# Patient Record
Sex: FEMALE | Race: WHITE | Employment: PART TIME | URBAN - METROPOLITAN AREA
[De-identification: names, ages, dates, MRNs, and addresses within clinical notes are randomized per-mention and may not be internally consistent; named-entity substitution may affect disease eponyms.]

---

## 2023-11-07 ENCOUNTER — APPOINTMENT (OUTPATIENT)
Dept: GENERAL RADIOLOGY | Age: 18
End: 2023-11-07
Payer: OTHER MISCELLANEOUS

## 2023-11-07 ENCOUNTER — HOSPITAL ENCOUNTER (EMERGENCY)
Age: 18
Discharge: HOME | End: 2023-11-08
Attending: EMERGENCY MEDICINE
Payer: OTHER MISCELLANEOUS

## 2023-11-07 DIAGNOSIS — S69.90XA INJURY OF WRIST, UNSPECIFIED LATERALITY, INITIAL ENCOUNTER: Primary | ICD-10-CM

## 2023-11-07 PROCEDURE — 73110 X-RAY EXAM OF WRIST: CPT

## 2023-11-07 PROCEDURE — 25605 CLTX DST RDL FX/EPHYS SEP W/: CPT

## 2023-11-07 PROCEDURE — 6360000002 HC RX W HCPCS: Performed by: EMERGENCY MEDICINE

## 2023-11-07 PROCEDURE — 73130 X-RAY EXAM OF HAND: CPT

## 2023-11-07 PROCEDURE — 2580000003 HC RX 258: Performed by: EMERGENCY MEDICINE

## 2023-11-07 PROCEDURE — 73100 X-RAY EXAM OF WRIST: CPT

## 2023-11-07 PROCEDURE — 96374 THER/PROPH/DIAG INJ IV PUSH: CPT

## 2023-11-07 PROCEDURE — 2500000003 HC RX 250 WO HCPCS: Performed by: EMERGENCY MEDICINE

## 2023-11-07 PROCEDURE — 99285 EMERGENCY DEPT VISIT HI MDM: CPT

## 2023-11-07 PROCEDURE — 2700000000 HC OXYGEN THERAPY PER DAY

## 2023-11-07 PROCEDURE — 96375 TX/PRO/DX INJ NEW DRUG ADDON: CPT

## 2023-11-07 PROCEDURE — 99152 MOD SED SAME PHYS/QHP 5/>YRS: CPT

## 2023-11-07 RX ORDER — 0.9 % SODIUM CHLORIDE 0.9 %
1000 INTRAVENOUS SOLUTION INTRAVENOUS ONCE
Status: COMPLETED | OUTPATIENT
Start: 2023-11-07 | End: 2023-11-07

## 2023-11-07 RX ORDER — ETOMIDATE 2 MG/ML
10 INJECTION INTRAVENOUS ONCE
Status: COMPLETED | OUTPATIENT
Start: 2023-11-07 | End: 2023-11-07

## 2023-11-07 RX ORDER — MORPHINE SULFATE 4 MG/ML
4 INJECTION, SOLUTION INTRAMUSCULAR; INTRAVENOUS ONCE
Status: COMPLETED | OUTPATIENT
Start: 2023-11-07 | End: 2023-11-07

## 2023-11-07 RX ADMIN — SODIUM CHLORIDE 1000 ML: 9 INJECTION, SOLUTION INTRAVENOUS at 23:05

## 2023-11-07 RX ADMIN — MORPHINE SULFATE 4 MG: 4 INJECTION, SOLUTION INTRAMUSCULAR; INTRAVENOUS at 23:03

## 2023-11-07 RX ADMIN — ETOMIDATE 10 MG: 2 INJECTION, SOLUTION INTRAVENOUS at 23:44

## 2023-11-07 ASSESSMENT — LIFESTYLE VARIABLES
HOW MANY STANDARD DRINKS CONTAINING ALCOHOL DO YOU HAVE ON A TYPICAL DAY: 1 OR 2
HOW OFTEN DO YOU HAVE A DRINK CONTAINING ALCOHOL: MONTHLY OR LESS

## 2023-11-07 ASSESSMENT — PAIN - FUNCTIONAL ASSESSMENT
PAIN_FUNCTIONAL_ASSESSMENT: INTOLERABLE, UNABLE TO DO ANY ACTIVE OR PASSIVE ACTIVITIES
PAIN_FUNCTIONAL_ASSESSMENT: 0-10

## 2023-11-07 ASSESSMENT — PAIN DESCRIPTION - ORIENTATION: ORIENTATION: LEFT

## 2023-11-07 ASSESSMENT — PAIN SCALES - GENERAL: PAINLEVEL_OUTOF10: 10

## 2023-11-07 ASSESSMENT — PAIN DESCRIPTION - LOCATION: LOCATION: WRIST

## 2023-11-07 ASSESSMENT — PAIN DESCRIPTION - DESCRIPTORS: DESCRIPTORS: SHARP

## 2023-11-08 ENCOUNTER — TELEPHONE (OUTPATIENT)
Dept: ORTHOPEDIC SURGERY | Age: 18
End: 2023-11-08

## 2023-11-08 ENCOUNTER — OFFICE VISIT (OUTPATIENT)
Dept: ORTHOPEDIC SURGERY | Age: 18
End: 2023-11-08

## 2023-11-08 VITALS
DIASTOLIC BLOOD PRESSURE: 85 MMHG | BODY MASS INDEX: 16.48 KG/M2 | TEMPERATURE: 98.3 F | OXYGEN SATURATION: 100 % | HEART RATE: 90 BPM | RESPIRATION RATE: 14 BRPM | WEIGHT: 105 LBS | HEIGHT: 67 IN | SYSTOLIC BLOOD PRESSURE: 119 MMHG

## 2023-11-08 VITALS — BODY MASS INDEX: 16.48 KG/M2 | HEIGHT: 67 IN | WEIGHT: 105 LBS

## 2023-11-08 DIAGNOSIS — S52.572A OTHER CLOSED INTRA-ARTICULAR FRACTURE OF DISTAL END OF LEFT RADIUS, INITIAL ENCOUNTER: Primary | ICD-10-CM

## 2023-11-08 DIAGNOSIS — F17.200 CURRENT SMOKER: ICD-10-CM

## 2023-11-08 PROBLEM — S52.502A CLOSED FRACTURE OF LEFT DISTAL RADIUS: Status: ACTIVE | Noted: 2023-11-08

## 2023-11-08 PROCEDURE — 6370000000 HC RX 637 (ALT 250 FOR IP): Performed by: EMERGENCY MEDICINE

## 2023-11-08 RX ORDER — OXYCODONE HYDROCHLORIDE AND ACETAMINOPHEN 5; 325 MG/1; MG/1
2 TABLET ORAL ONCE
Status: COMPLETED | OUTPATIENT
Start: 2023-11-08 | End: 2023-11-08

## 2023-11-08 RX ORDER — OXYCODONE HYDROCHLORIDE AND ACETAMINOPHEN 5; 325 MG/1; MG/1
1 TABLET ORAL EVERY 6 HOURS PRN
Qty: 12 TABLET | Refills: 0 | Status: SHIPPED | OUTPATIENT
Start: 2023-11-08 | End: 2023-11-11

## 2023-11-08 RX ADMIN — OXYCODONE AND ACETAMINOPHEN 2 TABLET: 5; 325 TABLET ORAL at 00:29

## 2023-11-08 ASSESSMENT — PAIN SCALES - GENERAL: PAINLEVEL_OUTOF10: 10

## 2023-11-08 ASSESSMENT — PAIN DESCRIPTION - LOCATION: LOCATION: ARM
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
## 2023-11-08 NOTE — PROGRESS NOTES
Patient not reached. Preop instructions left on voice mail. Number_______________    -Date__11/9/23_____time__1015_____arrival____0845 masc________  -Nothing to eat or drink after midnight  -Responsible adult 25 or older to stay on site while you are here and drive you home and stay with you after  -Follow any instructions your doctors office has given you  -Bring a complete list of all your medications and supplements  -If you normally take the following medications in the morning please do so with a small    sip of water-heart,blood pressure,seizure,breathing or thyroid-avoid water pilll Do not take blood pressure medications ending in \"ivan\" or \"pril\" the AM of surgery or the nikki prior  -You may use your inhalers  -Take half of your normal dose of any long acting insulins the night before-do not take    any diabetic medications in the morning  -Follow your doctors instructions regarding blood thinners  -Any questions call your surgeons office            VISITOR POLICY(subject to change)    Current policy is 2 visitors per patient. No children. Masks at discretion of facility. Visiting hours are 8a-8p. Overnight visitors will be at the discretion of the nurse. All policies are subject to change.
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
## 2023-11-08 NOTE — PROGRESS NOTES
In room for conscious sedation. Pt was placed on 2l/m nasal cannula, ETC02 , ambu bag and suction equipment.  Pt did not have any respiratory issues during procedure

## 2023-11-08 NOTE — TELEPHONE ENCOUNTER
Auth: NPR  Date: 11/09/2023  Reference # [id number]  Spoke with: ONLINE  Type of SX: OUTPATIENT  Location: [facility]  CPT: [id number]   DX: S52.502A  SX area: LEFT WRIST  Insurance: Mi Dennis

## 2023-11-08 NOTE — ED NOTES
D/C: Order noted for d/c. Pt confirmed d/c paperwork have correct name. Discharge and education instructions reviewed with patient. Teach-back successful. Pt verbalized understanding and signed d/c papers. Pt denied questions at this time. No acute distress noted. Patient instructed to follow-up as noted - return to emergency department if symptoms worsen. Patient verbalized understanding. Discharged per EDMD with discharge instructions. Pt discharged  to private vehicle. Patient stable upon departure. Pt alert and oriented upon time of discharge, pt wheeled to car with mom and boyfriend.      Litzy Moss RN  11/08/23 0127

## 2023-11-09 ENCOUNTER — APPOINTMENT (OUTPATIENT)
Dept: GENERAL RADIOLOGY | Age: 18
End: 2023-11-09
Attending: ORTHOPAEDIC SURGERY
Payer: COMMERCIAL

## 2023-11-09 ENCOUNTER — ANESTHESIA (OUTPATIENT)
Dept: OPERATING ROOM | Age: 18
End: 2023-11-09
Payer: COMMERCIAL

## 2023-11-09 ENCOUNTER — HOSPITAL ENCOUNTER (OUTPATIENT)
Age: 18
Setting detail: OUTPATIENT SURGERY
Discharge: HOME | End: 2023-11-09
Attending: ORTHOPAEDIC SURGERY | Admitting: ORTHOPAEDIC SURGERY
Payer: COMMERCIAL

## 2023-11-09 ENCOUNTER — ANESTHESIA EVENT (OUTPATIENT)
Dept: OPERATING ROOM | Age: 18
End: 2023-11-09
Payer: COMMERCIAL

## 2023-11-09 VITALS
DIASTOLIC BLOOD PRESSURE: 83 MMHG | HEIGHT: 66 IN | WEIGHT: 101 LBS | TEMPERATURE: 97.5 F | BODY MASS INDEX: 16.23 KG/M2 | OXYGEN SATURATION: 100 % | SYSTOLIC BLOOD PRESSURE: 120 MMHG | HEART RATE: 77 BPM | RESPIRATION RATE: 13 BRPM

## 2023-11-09 LAB — HCG UR QL: NEGATIVE

## 2023-11-09 PROCEDURE — 3700000000 HC ANESTHESIA ATTENDED CARE: Performed by: ORTHOPAEDIC SURGERY

## 2023-11-09 PROCEDURE — 2720000010 HC SURG SUPPLY STERILE: Performed by: ORTHOPAEDIC SURGERY

## 2023-11-09 PROCEDURE — C1713 ANCHOR/SCREW BN/BN,TIS/BN: HCPCS | Performed by: ORTHOPAEDIC SURGERY

## 2023-11-09 PROCEDURE — 2580000003 HC RX 258: Performed by: ORTHOPAEDIC SURGERY

## 2023-11-09 PROCEDURE — 7100000011 HC PHASE II RECOVERY - ADDTL 15 MIN: Performed by: ORTHOPAEDIC SURGERY

## 2023-11-09 PROCEDURE — 6360000002 HC RX W HCPCS: Performed by: NURSE ANESTHETIST, CERTIFIED REGISTERED

## 2023-11-09 PROCEDURE — 84703 CHORIONIC GONADOTROPIN ASSAY: CPT

## 2023-11-09 PROCEDURE — A4217 STERILE WATER/SALINE, 500 ML: HCPCS | Performed by: ORTHOPAEDIC SURGERY

## 2023-11-09 PROCEDURE — 2709999900 HC NON-CHARGEABLE SUPPLY: Performed by: ORTHOPAEDIC SURGERY

## 2023-11-09 PROCEDURE — 6360000002 HC RX W HCPCS: Performed by: ORTHOPAEDIC SURGERY

## 2023-11-09 PROCEDURE — 7100000001 HC PACU RECOVERY - ADDTL 15 MIN: Performed by: ORTHOPAEDIC SURGERY

## 2023-11-09 PROCEDURE — 7100000000 HC PACU RECOVERY - FIRST 15 MIN: Performed by: ORTHOPAEDIC SURGERY

## 2023-11-09 PROCEDURE — 6370000000 HC RX 637 (ALT 250 FOR IP): Performed by: ANESTHESIOLOGY

## 2023-11-09 PROCEDURE — 3700000001 HC ADD 15 MINUTES (ANESTHESIA): Performed by: ORTHOPAEDIC SURGERY

## 2023-11-09 PROCEDURE — 3600000014 HC SURGERY LEVEL 4 ADDTL 15MIN: Performed by: ORTHOPAEDIC SURGERY

## 2023-11-09 PROCEDURE — 3600000004 HC SURGERY LEVEL 4 BASE: Performed by: ORTHOPAEDIC SURGERY

## 2023-11-09 PROCEDURE — 7100000010 HC PHASE II RECOVERY - FIRST 15 MIN: Performed by: ORTHOPAEDIC SURGERY

## 2023-11-09 PROCEDURE — 6360000002 HC RX W HCPCS

## 2023-11-09 PROCEDURE — 73100 X-RAY EXAM OF WRIST: CPT

## 2023-11-09 PROCEDURE — 6360000002 HC RX W HCPCS: Performed by: ANESTHESIOLOGY

## 2023-11-09 PROCEDURE — 2500000003 HC RX 250 WO HCPCS: Performed by: NURSE ANESTHETIST, CERTIFIED REGISTERED

## 2023-11-09 DEVICE — VOLAR PLATE NARROW LEFT, X-SHORT
Type: IMPLANTABLE DEVICE | Site: WRIST | Status: FUNCTIONAL
Brand: VARIAX

## 2023-11-09 DEVICE — LOCKING SCREW, FULLY THREADED,T8
Type: IMPLANTABLE DEVICE | Site: WRIST | Status: FUNCTIONAL
Brand: VARIAX

## 2023-11-09 DEVICE — BONE SCREW, FULLY THREADED, T8
Type: IMPLANTABLE DEVICE | Site: WRIST | Status: FUNCTIONAL
Brand: VARIAX

## 2023-11-09 RX ORDER — HYDRALAZINE HYDROCHLORIDE 20 MG/ML
10 INJECTION INTRAMUSCULAR; INTRAVENOUS
Status: DISCONTINUED | OUTPATIENT
Start: 2023-11-09 | End: 2023-11-09 | Stop reason: HOSPADM

## 2023-11-09 RX ORDER — DEXAMETHASONE SODIUM PHOSPHATE 4 MG/ML
INJECTION, SOLUTION INTRA-ARTICULAR; INTRALESIONAL; INTRAMUSCULAR; INTRAVENOUS; SOFT TISSUE PRN
Status: DISCONTINUED | OUTPATIENT
Start: 2023-11-09 | End: 2023-11-09 | Stop reason: SDUPTHER

## 2023-11-09 RX ORDER — HYDROMORPHONE HYDROCHLORIDE 2 MG/ML
0.5 INJECTION, SOLUTION INTRAMUSCULAR; INTRAVENOUS; SUBCUTANEOUS EVERY 5 MIN PRN
Status: DISCONTINUED | OUTPATIENT
Start: 2023-11-09 | End: 2023-11-09 | Stop reason: HOSPADM

## 2023-11-09 RX ORDER — SODIUM CHLORIDE 0.9 % (FLUSH) 0.9 %
5-40 SYRINGE (ML) INJECTION EVERY 12 HOURS SCHEDULED
Status: DISCONTINUED | OUTPATIENT
Start: 2023-11-09 | End: 2023-11-09 | Stop reason: HOSPADM

## 2023-11-09 RX ORDER — ONDANSETRON 2 MG/ML
INJECTION INTRAMUSCULAR; INTRAVENOUS PRN
Status: DISCONTINUED | OUTPATIENT
Start: 2023-11-09 | End: 2023-11-09 | Stop reason: SDUPTHER

## 2023-11-09 RX ORDER — PROPOFOL 10 MG/ML
INJECTION, EMULSION INTRAVENOUS PRN
Status: DISCONTINUED | OUTPATIENT
Start: 2023-11-09 | End: 2023-11-09 | Stop reason: SDUPTHER

## 2023-11-09 RX ORDER — MIDAZOLAM HYDROCHLORIDE 1 MG/ML
INJECTION INTRAMUSCULAR; INTRAVENOUS PRN
Status: DISCONTINUED | OUTPATIENT
Start: 2023-11-09 | End: 2023-11-09 | Stop reason: SDUPTHER

## 2023-11-09 RX ORDER — PROCHLORPERAZINE EDISYLATE 5 MG/ML
5 INJECTION INTRAMUSCULAR; INTRAVENOUS
Status: DISCONTINUED | OUTPATIENT
Start: 2023-11-09 | End: 2023-11-09 | Stop reason: HOSPADM

## 2023-11-09 RX ORDER — FENTANYL CITRATE 50 UG/ML
INJECTION, SOLUTION INTRAMUSCULAR; INTRAVENOUS
Status: COMPLETED
Start: 2023-11-09 | End: 2023-11-09

## 2023-11-09 RX ORDER — ONDANSETRON 2 MG/ML
4 INJECTION INTRAMUSCULAR; INTRAVENOUS
Status: DISCONTINUED | OUTPATIENT
Start: 2023-11-09 | End: 2023-11-09 | Stop reason: HOSPADM

## 2023-11-09 RX ORDER — FENTANYL CITRATE 50 UG/ML
25 INJECTION, SOLUTION INTRAMUSCULAR; INTRAVENOUS EVERY 5 MIN PRN
Status: DISCONTINUED | OUTPATIENT
Start: 2023-11-09 | End: 2023-11-09 | Stop reason: HOSPADM

## 2023-11-09 RX ORDER — SODIUM CHLORIDE 0.9 % (FLUSH) 0.9 %
5-40 SYRINGE (ML) INJECTION PRN
Status: DISCONTINUED | OUTPATIENT
Start: 2023-11-09 | End: 2023-11-09 | Stop reason: HOSPADM

## 2023-11-09 RX ORDER — OXYCODONE HYDROCHLORIDE 5 MG/1
5 TABLET ORAL
Status: COMPLETED | OUTPATIENT
Start: 2023-11-09 | End: 2023-11-09

## 2023-11-09 RX ORDER — CEPHALEXIN 500 MG/1
500 CAPSULE ORAL 4 TIMES DAILY
Qty: 20 CAPSULE | Refills: 0 | Status: SHIPPED | OUTPATIENT
Start: 2023-11-09 | End: 2023-11-14

## 2023-11-09 RX ORDER — BUPIVACAINE HYDROCHLORIDE 5 MG/ML
INJECTION, SOLUTION EPIDURAL; INTRACAUDAL
Status: COMPLETED | OUTPATIENT
Start: 2023-11-09 | End: 2023-11-09

## 2023-11-09 RX ORDER — LIDOCAINE HYDROCHLORIDE 10 MG/ML
1 INJECTION, SOLUTION EPIDURAL; INFILTRATION; INTRACAUDAL; PERINEURAL
Status: DISCONTINUED | OUTPATIENT
Start: 2023-11-09 | End: 2023-11-09 | Stop reason: HOSPADM

## 2023-11-09 RX ORDER — SODIUM CHLORIDE 9 MG/ML
INJECTION, SOLUTION INTRAVENOUS PRN
Status: DISCONTINUED | OUTPATIENT
Start: 2023-11-09 | End: 2023-11-09 | Stop reason: HOSPADM

## 2023-11-09 RX ORDER — LABETALOL HYDROCHLORIDE 5 MG/ML
10 INJECTION, SOLUTION INTRAVENOUS
Status: DISCONTINUED | OUTPATIENT
Start: 2023-11-09 | End: 2023-11-09 | Stop reason: HOSPADM

## 2023-11-09 RX ORDER — SODIUM CHLORIDE 9 MG/ML
INJECTION, SOLUTION INTRAVENOUS CONTINUOUS
Status: DISCONTINUED | OUTPATIENT
Start: 2023-11-09 | End: 2023-11-09 | Stop reason: HOSPADM

## 2023-11-09 RX ORDER — LIDOCAINE HYDROCHLORIDE 20 MG/ML
INJECTION, SOLUTION EPIDURAL; INFILTRATION; INTRACAUDAL; PERINEURAL PRN
Status: DISCONTINUED | OUTPATIENT
Start: 2023-11-09 | End: 2023-11-09 | Stop reason: SDUPTHER

## 2023-11-09 RX ORDER — FENTANYL CITRATE 50 UG/ML
INJECTION, SOLUTION INTRAMUSCULAR; INTRAVENOUS PRN
Status: DISCONTINUED | OUTPATIENT
Start: 2023-11-09 | End: 2023-11-09 | Stop reason: SDUPTHER

## 2023-11-09 RX ADMIN — FENTANYL CITRATE 50 MCG: 50 INJECTION, SOLUTION INTRAMUSCULAR; INTRAVENOUS at 10:04

## 2023-11-09 RX ADMIN — PROPOFOL 50 MG: 10 INJECTION, EMULSION INTRAVENOUS at 09:47

## 2023-11-09 RX ADMIN — FENTANYL CITRATE 25 MCG: 50 INJECTION INTRAMUSCULAR; INTRAVENOUS at 11:11

## 2023-11-09 RX ADMIN — FENTANYL CITRATE 25 MCG: 50 INJECTION, SOLUTION INTRAMUSCULAR; INTRAVENOUS at 09:44

## 2023-11-09 RX ADMIN — FENTANYL CITRATE 25 MCG: 50 INJECTION, SOLUTION INTRAMUSCULAR; INTRAVENOUS at 11:11

## 2023-11-09 RX ADMIN — FENTANYL CITRATE 50 MCG: 50 INJECTION, SOLUTION INTRAMUSCULAR; INTRAVENOUS at 09:50

## 2023-11-09 RX ADMIN — OXYCODONE HYDROCHLORIDE 5 MG: 5 TABLET ORAL at 12:06

## 2023-11-09 RX ADMIN — CEFAZOLIN 2000 MG: 2 INJECTION, POWDER, FOR SOLUTION INTRAMUSCULAR; INTRAVENOUS at 09:40

## 2023-11-09 RX ADMIN — HYDROMORPHONE HYDROCHLORIDE 0.5 MG: 2 INJECTION, SOLUTION INTRAMUSCULAR; INTRAVENOUS; SUBCUTANEOUS at 10:57

## 2023-11-09 RX ADMIN — HYDROMORPHONE HYDROCHLORIDE 0.5 MG: 2 INJECTION, SOLUTION INTRAMUSCULAR; INTRAVENOUS; SUBCUTANEOUS at 11:06

## 2023-11-09 RX ADMIN — ONDANSETRON 4 MG: 2 INJECTION INTRAMUSCULAR; INTRAVENOUS at 09:54

## 2023-11-09 RX ADMIN — DEXAMETHASONE SODIUM PHOSPHATE 4 MG: 4 INJECTION, SOLUTION INTRAMUSCULAR; INTRAVENOUS at 09:54

## 2023-11-09 RX ADMIN — FENTANYL CITRATE 25 MCG: 50 INJECTION, SOLUTION INTRAMUSCULAR; INTRAVENOUS at 09:42

## 2023-11-09 RX ADMIN — HYDROMORPHONE HYDROCHLORIDE 0.5 MG: 2 INJECTION, SOLUTION INTRAMUSCULAR; INTRAVENOUS; SUBCUTANEOUS at 11:02

## 2023-11-09 RX ADMIN — SODIUM CHLORIDE: 9 INJECTION, SOLUTION INTRAVENOUS at 08:19

## 2023-11-09 RX ADMIN — MIDAZOLAM 1 MG: 1 INJECTION INTRAMUSCULAR; INTRAVENOUS at 09:44

## 2023-11-09 RX ADMIN — MIDAZOLAM 1 MG: 1 INJECTION INTRAMUSCULAR; INTRAVENOUS at 09:42

## 2023-11-09 RX ADMIN — LIDOCAINE HYDROCHLORIDE 40 MG: 20 INJECTION, SOLUTION EPIDURAL; INFILTRATION; INTRACAUDAL; PERINEURAL at 09:45

## 2023-11-09 RX ADMIN — PROPOFOL 200 MG: 10 INJECTION, EMULSION INTRAVENOUS at 09:46

## 2023-11-09 ASSESSMENT — PAIN DESCRIPTION - DESCRIPTORS
DESCRIPTORS: ACHING
DESCRIPTORS: ACHING;SHARP

## 2023-11-09 ASSESSMENT — PAIN DESCRIPTION - PAIN TYPE
TYPE: SURGICAL PAIN

## 2023-11-09 ASSESSMENT — PAIN DESCRIPTION - LOCATION
LOCATION: ARM

## 2023-11-09 ASSESSMENT — PAIN - FUNCTIONAL ASSESSMENT: PAIN_FUNCTIONAL_ASSESSMENT: 0-10

## 2023-11-09 ASSESSMENT — PAIN SCALES - GENERAL
PAINLEVEL_OUTOF10: 5
PAINLEVEL_OUTOF10: 9
PAINLEVEL_OUTOF10: 3
PAINLEVEL_OUTOF10: 2

## 2023-11-09 ASSESSMENT — PAIN DESCRIPTION - ORIENTATION
ORIENTATION: LEFT

## 2023-11-09 ASSESSMENT — ENCOUNTER SYMPTOMS: SHORTNESS OF BREATH: 0

## 2023-11-09 NOTE — PROGRESS NOTES
Pt awake and alert at this time. Pt on RA, and VSS. Pt reports improved pain and denies nausea, tolerating PO. Skin warm, splint and ace in place, able to wiggle fingers. Pt meets criteria to be discharged from Phase 1.

## 2023-11-09 NOTE — BRIEF OP NOTE
Brief Postoperative Note      Patient: Mone Moss  YOB: 2005  MRN: [id number]    Date of Procedure: 11/9/2023    Pre-Op Diagnosis Codes:     * Closed fracture of distal end of left radius, unspecified fracture morphology, initial encounter [S52.502A]    Post-Op Diagnosis: Same       Procedure(s):  LEFT OPEN REDUCTION AND INTERNAL FIXATION WRIST-GALI    Surgeon(s):  Yoel Ochoa MD    Assistant:  First Assistant: Denver Seller, RN    Anesthesia: General    Estimated Blood Loss (mL): Minimal    Complications: None    Specimens:   * No specimens in log *    Implants:  Implant Name Type Inv. Item Serial No.  Lot No. LRB No. Used Action   PLATE BNE JOYCE XSH HARI 49X2 MM LT VOLAR DSTL 8 HOLE TI STRL - [id number]  PLATE BNE JOYCE XSH HARI 49X2 MM LT VOLAR DSTL 8 HOLE TI STRL  [employer] [facility]  Left 1 Implanted   SCREW BONE L14MM DIA2.7MM WR TI ALLOY NONLOCKING FULL THRD - [id number]  SCREW BONE L14MM DIA2.7MM WR TI ALLOY NONLOCKING FULL THRD  [employer] [facility]  Left 1 Implanted   SCREW BNE L20MM DIA2. 7MM LES FULL THRD T8 DRV VARIAX - [id number]  SCREW BNE L20MM DIA2. 7MM LES FULL THRD T8 DRV VARIAX  [employer] [facility]  Left 1 Implanted   SCREW BONE L18MM DIA2.7MM WR TI ALLOY LCK FULL THRD T8 DRV - [id number]  SCREW BONE L18MM DIA2.7MM WR TI ALLOY LCK FULL THRD T8 DRV  [employer] [facility]  Left 1 Implanted   SCREW BNE L16MM DIA2.7MM WRST TI ALLY LES FULL THRD T8 DRV - [id number]  SCREW BNE L16MM DIA2.7MM WRST TI ALLY LES FULL THRD T8 DRV  [employer] [facility]  Left 1 Implanted   SCREW BONE L14MM DIA2.7MM WR TI ALLOY LCK FULL THRD T8 DRV - [id number]  SCREW BONE L14MM DIA2.7MM WR TI ALLOY LCK FULL THRD T8 DRV  [employer]  Left 1 Implanted         Drains: * No LDAs found *    Findings: Same.       Electronically signed by Yoel Ochoa MD on 11/9/2023 at 1:19 PM

## 2023-11-09 NOTE — PROGRESS NOTES
Discharge instructions review with patient and significant other, Rommel. All home medications have been reviewed, pt v/u. Pt discharged via wheelchair. Pt discharged with filled prescription and all belongings. Boyfriend taking stable pt home.
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
## 2023-11-09 NOTE — ANESTHESIA POSTPROCEDURE EVALUATION
Department of Anesthesiology  Postprocedure Note    Patient: Brianne Mann  MRN: [id number]  YOB: 2005  Date of evaluation: 11/9/2023      Procedure Summary     Date: 11/09/23 Room / Location: [address]    Anesthesia Start: 9368 Anesthesia Stop: 1038    Procedure: LEFT OPEN REDUCTION AND INTERNAL FIXATION WRIST-GALI (Left: Wrist) Diagnosis:       Closed fracture of distal end of left radius, unspecified fracture morphology, initial encounter      (Closed fracture of distal end of left radius, unspecified fracture morphology, initial encounter [S52.502A])    Surgeons: Denise Karimi MD Responsible Provider: Dakota Caballero MD    Anesthesia Type: general ASA Status: 1          Anesthesia Type: No value filed.     Catracho Phase I: Catracho Score: 10    Catracho Phase II:        Anesthesia Post Evaluation    Patient location during evaluation: PACU  Patient participation: complete - patient participated  Level of consciousness: awake and alert  Airway patency: patent  Nausea & Vomiting: no nausea and no vomiting  Complications: no  Cardiovascular status: hemodynamically stable  Respiratory status: acceptable  Hydration status: stable  Multimodal analgesia pain management approach  Pain management: adequate

## 2023-11-09 NOTE — PROGRESS NOTES
Pt arrived from OR to PACU bay 9. Reported received from [facility] [employer] staff. Pt not arousable to voice. Surgical incisions dressings in place to L wrist. Pt on RA with OPA, NSR, and VSS. Will continue to monitor.

## 2023-11-09 NOTE — DISCHARGE INSTRUCTIONS
lightheadedness, dizziness, or sleepiness following surgery. Rest at home today- increase activity as tolerated. Progress slowly to a regular diet unless your physician has instructed you otherwise. Drink plenty of water. If persistent nausea and vomiting becomes a problem, call your physician. Coughing, sore throat and muscle aches are other side effects of anesthesia, and should improve with time. Do not drive or operate machinery while taking narcotics.

## 2023-11-10 DIAGNOSIS — S52.572A OTHER CLOSED INTRA-ARTICULAR FRACTURE OF DISTAL END OF LEFT RADIUS, INITIAL ENCOUNTER: Primary | ICD-10-CM

## 2023-11-10 RX ORDER — HYDROCODONE BITARTRATE AND ACETAMINOPHEN 5; 325 MG/1; MG/1
1 TABLET ORAL EVERY 6 HOURS PRN
Qty: 20 TABLET | Refills: 0 | Status: SHIPPED | OUTPATIENT
Start: 2023-11-10 | End: 2023-11-15

## 2023-11-10 RX ORDER — CEPHALEXIN 500 MG/1
500 CAPSULE ORAL 4 TIMES DAILY
Qty: 20 CAPSULE | Refills: 0 | Status: SHIPPED | OUTPATIENT
Start: 2023-11-10 | End: 2023-11-15

## 2023-11-10 NOTE — TELEPHONE ENCOUNTER
Oxycodone 5-325 on 11/8/23 QT: 12 tablets    Will route [place] to [facility] per surgical protocol. [facility] [address] selected.

## 2023-11-10 NOTE — TELEPHONE ENCOUNTER
Called and spoke with patient to notify that a prescription called Lin Muñoz will be sent to her pharmacy today. Educated on supplementing with Tylenol as patient was distraught about pain. Confirmed that patient is elevating her hand above heart level to assist with swelling and pain control. I advised the patient to call our office today if she has further concerns.

## 2023-11-10 NOTE — TELEPHONE ENCOUNTER
General Question     Subject: PRESCRIPTION REFILL  Patient and /or Facility Request: Laura Cerda  Contact Number: [phone]    PATIENTS MOTHER CALLED IN REQUESTING A REFILL ON PAIN MEDICATION. PATIENTS MOTHER STATES THE PATIENT HAS 3 PILLS LEFT. PATIENT IS EXTREME PAIN AND HAS ENOUGH DOSES FOR TODAY DAY    PATIENT HAD SURGERY YESTERDAY MORNING. PATIENT CAN BE REACHED AT THE TELEPHONE NUMBER LISTED ABOVE.     PATIENT REQUEST THE PRESCRIPTION TO BE SENT TO [address] [place] [address] Se

## 2023-11-22 ENCOUNTER — OFFICE VISIT (OUTPATIENT)
Dept: ORTHOPEDIC SURGERY | Age: 18
End: 2023-11-22

## 2023-11-22 VITALS — HEIGHT: 66 IN | WEIGHT: 100.97 LBS | BODY MASS INDEX: 16.23 KG/M2

## 2023-11-22 DIAGNOSIS — S52.572A OTHER CLOSED INTRA-ARTICULAR FRACTURE OF DISTAL END OF LEFT RADIUS, INITIAL ENCOUNTER: Primary | ICD-10-CM

## 2024-01-03 ENCOUNTER — OFFICE VISIT (OUTPATIENT)
Dept: ORTHOPEDIC SURGERY | Age: 19
End: 2024-01-03

## 2024-01-03 DIAGNOSIS — S52.572A OTHER CLOSED INTRA-ARTICULAR FRACTURE OF DISTAL END OF LEFT RADIUS, INITIAL ENCOUNTER: Primary | ICD-10-CM

## 2024-01-03 PROCEDURE — 99024 POSTOP FOLLOW-UP VISIT: CPT | Performed by: NURSE PRACTITIONER

## 2024-01-03 PROCEDURE — APPNB15 APP NON BILLABLE TIME 0-15 MINS: Performed by: NURSE PRACTITIONER

## 2024-01-04 NOTE — PROGRESS NOTES
DIAGNOSIS:  Left distal radius 2 parts intra articular displaced fracture, status post ORIF.    DATE OF SURGERY:  11/9/2023.    HISTORY OF PRESENT ILLNESS:  Ms. Servin 18 y.o.  female right handed returns today  for 8 weeks postoperative visit.  The patient denies any significant pain in the left wrist.  Rates pain a 0/10 VAS and doing very well.  Sometimes her index finger is painful when bending it and her wrist only becomes sore with increase use.  No numbness or tingling sensation. No fever or Chills. She  is in a brace. Works as a [employer].     PHYSICAL EXAMINATION:  The incision is completely healed .  No signs of any erythema or drainage. She  has no pain with the active or passive range of motion of the left wrist, but decrease ROM.  She  has intact sensation, distally, and she  is neurovascularly intact.    IMAGING:  Three views left wrist taken today in the office showed anatomic alignment of left distal radius, plate and screws in good position, no loosening.      IMPRESSION:  8 weeks out from left distal radius ORIF and doing very well.    PLAN:  I have told the patient to work on ROM, as well as strengthening exercises.  She would like to work on this on her own.  She can discontinue the forearm brace. No heavy impact activities. The patient will come back for a follow up in 6 weeks.  At that time, we will take 3 views of the left wrist. PT if needed then.    As this patient has demonstrated risk factors for osteoporosis, such as age greater than fifty years and evidence of a fracture, I have referred the patient back to the primary care physician for evaluation for osteoporosis, including consideration for DEXA scanning, if this is felt to be clinically indicated.  The patient is advised to contact the primary care physician to follow-up for further evaluation.             Faina Malagon, ANKUR - CNP

## 2024-02-14 ENCOUNTER — OFFICE VISIT (OUTPATIENT)
Dept: ORTHOPEDIC SURGERY | Age: 19
End: 2024-02-14
Payer: COMMERCIAL

## 2024-02-14 VITALS — BODY MASS INDEX: 16.07 KG/M2 | HEIGHT: 66 IN | WEIGHT: 100 LBS

## 2024-02-14 DIAGNOSIS — S52.572A OTHER CLOSED INTRA-ARTICULAR FRACTURE OF DISTAL END OF LEFT RADIUS, INITIAL ENCOUNTER: Primary | ICD-10-CM

## 2024-02-14 PROCEDURE — 99213 OFFICE O/P EST LOW 20 MIN: CPT | Performed by: ORTHOPAEDIC SURGERY

## 2024-02-14 PROCEDURE — G8484 FLU IMMUNIZE NO ADMIN: HCPCS | Performed by: ORTHOPAEDIC SURGERY

## 2024-02-14 PROCEDURE — 1036F TOBACCO NON-USER: CPT | Performed by: ORTHOPAEDIC SURGERY

## 2024-02-14 PROCEDURE — G8427 DOCREV CUR MEDS BY ELIG CLIN: HCPCS | Performed by: ORTHOPAEDIC SURGERY

## 2024-02-14 PROCEDURE — G8419 CALC BMI OUT NRM PARAM NOF/U: HCPCS | Performed by: ORTHOPAEDIC SURGERY

## 2024-02-14 NOTE — PROGRESS NOTES
DIAGNOSIS:  Left distal radius 2 parts intra articular displaced fracture, status post ORIF.    DATE OF SURGERY:  11/9/2023.    HISTORY OF PRESENT ILLNESS:  Jenni Servin 18 y.o.  female right handed returns today for 3 months postoperative visit.  The patient denies any significant pain in the left wrist.  Rates pain a 0/10 VAS and doing very well.  No numbness or tingling sensation. No fever or Chills.      No past medical history on file.    Past Surgical History:   Procedure Laterality Date    TONSILLECTOMY AND ADENOIDECTOMY      WRIST FRACTURE SURGERY Left 11/9/2023    LEFT OPEN REDUCTION AND INTERNAL FIXATION WRIST-GALI performed by Gabe Landers MD at [facility] ASC OR       Social History     Socioeconomic History    Marital status: Single     Spouse name: Not on file    Number of children: Not on file    Years of education: Not on file    Highest education level: Not on file   Occupational History    Not on file   Tobacco Use    Smoking status: Never    Smokeless tobacco: Never   Vaping Use    Vaping Use: Never used   Substance and Sexual Activity    Alcohol use: Yes     Comment: Occasionally    Drug use: Yes     Frequency: 7.0 times per week     Types: Marijuana (Weed)    Sexual activity: Not on file   Other Topics Concern    Not on file   Social History Narrative    Not on file     Social Determinants of Health     Financial Resource Strain: Not on file   Food Insecurity: Not on file   Transportation Needs: Not on file   Physical Activity: Not on file   Stress: Not on file   Social Connections: Not on file   Intimate Partner Violence: Not on file   Housing Stability: Not on file       No family history on file.    No current outpatient medications on file prior to visit.     No current facility-administered medications on file prior to visit.       Pertinent items are noted in HPI  Review of systems reviewed from Patient History Form and available in the patient's chart under the Media

## 2024-04-01 LAB
BV DNA PNL VAG NAA+PROBE: NORMAL
CANDIDA 6 SPECIES PROFILE, NAA: NORMAL
CANDIDA GLABRATA: NORMAL
CANDIDA KRUSEI: NORMAL
CHLAMYDIA TRACHOMATIS AMPLIFIED DET: NOT DETECTED
HB: SOURCE: NORMAL
N GONORRHOEAE AMPLIFIED DET: NOT DETECTED
SPECIMEN TYPE: NORMAL
VAGINAL PATHOGENS DNA PANEL: NORMAL

## 2024-08-07 ENCOUNTER — TELEPHONE (OUTPATIENT)
Dept: ORTHOPEDIC SURGERY | Age: 19
End: 2024-08-07

## 2024-08-07 NOTE — TELEPHONE ENCOUNTER
Returned phone call. B is not set up.     Upon return call please offer 8/14/24 at 8:30AM with Faina Malagon.
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
## 2024-08-07 NOTE — TELEPHONE ENCOUNTER
Other    PATIENT THAT HAD REY ON 11/9/23 SHE STATED HER WRIST IN A LOT OF PAIN AND IT LOOK LIKE HER BONE IS POPPING OUT , PATIENT STATED SHE NOT ABLE TO BEND WRIST    PATIENT IS WANTING TO COME IN ASAP     PLEASE CALL PATIENT [phone]

## 2024-08-07 NOTE — TELEPHONE ENCOUNTER
Returned phone call, VMB not set up.     Upon return call, Next available apt is 8/20/24 at  with Faina Malagon.

## 2024-08-07 NOTE — TELEPHONE ENCOUNTER
General Question     Subject: APPOINTMENT   Patient and /or Facility Request: Jenni Servin   Contact Number: [phone]     PATIENT CALLING BACK STATING THAT SHE CAN'T DO THAT TIME THAT SPECIFIC DAY AND WANTS TO KNOW IF SHE CAN COME ANY OTHER DAY     PLEASE ADVISE

## 2024-08-08 NOTE — TELEPHONE ENCOUNTER
Called patient. Went straight to .   VMB not set up.     Upon return call, please offer 1:00 PM at  or 2:00 PM today, 8/8/24 with Faina Malagon.

## 2024-08-16 ENCOUNTER — TELEPHONE (OUTPATIENT)
Dept: ORTHOPEDIC SURGERY | Age: 19
End: 2024-08-16

## 2024-09-04 ENCOUNTER — OFFICE VISIT (OUTPATIENT)
Dept: ORTHOPEDIC SURGERY | Age: 19
End: 2024-09-04
Payer: COMMERCIAL

## 2024-09-04 DIAGNOSIS — S52.572A OTHER CLOSED INTRA-ARTICULAR FRACTURE OF DISTAL END OF LEFT RADIUS, INITIAL ENCOUNTER: ICD-10-CM

## 2024-09-04 DIAGNOSIS — M67.40 GANGLION CYST: Primary | ICD-10-CM

## 2024-09-04 PROCEDURE — G8427 DOCREV CUR MEDS BY ELIG CLIN: HCPCS | Performed by: ORTHOPAEDIC SURGERY

## 2024-09-04 PROCEDURE — 99214 OFFICE O/P EST MOD 30 MIN: CPT | Performed by: ORTHOPAEDIC SURGERY

## 2024-09-04 PROCEDURE — G8419 CALC BMI OUT NRM PARAM NOF/U: HCPCS | Performed by: ORTHOPAEDIC SURGERY

## 2024-09-04 PROCEDURE — 1036F TOBACCO NON-USER: CPT | Performed by: ORTHOPAEDIC SURGERY

## 2024-09-04 NOTE — PROGRESS NOTES
CHIEF COMPLAINT:   1- Left wrist pain/ dorsal wrist mass(ganglion cyst).  2- Left distal radius 2 parts intra articular displaced fracture, status post ORIF.    DATE OF SURGERY:  11/9/2023    HISTORY:  Ms. Servin is 18 y.o.  female right handed presents today for the first visit for evaluation of a left wrist dorsal pain with no known injury which started Aug 2024 and went to [facility] on 8/8/2024.  She is complaining of left wrist pain, 2/10. This is better with elevation and worse with ROM. No numbness or tingling sensation.  No other complaint.     No past medical history on file.    Past Surgical History:   Procedure Laterality Date    TONSILLECTOMY AND ADENOIDECTOMY      WRIST FRACTURE SURGERY Left 11/9/2023    LEFT OPEN REDUCTION AND INTERNAL FIXATION WRIST-GALI performed by Gabe Landers MD at [facility] ASC OR       Social History     Socioeconomic History    Marital status: Single     Spouse name: Not on file    Number of children: Not on file    Years of education: Not on file    Highest education level: Not on file   Occupational History    Not on file   Tobacco Use    Smoking status: Never    Smokeless tobacco: Never   Vaping Use    Vaping status: Never Used   Substance and Sexual Activity    Alcohol use: Yes     Comment: Occasionally    Drug use: Yes     Frequency: 7.0 times per week     Types: Marijuana (Weed)    Sexual activity: Not on file   Other Topics Concern    Not on file   Social History Narrative    Not on file     Social Determinants of Health     Financial Resource Strain: Not on file   Food Insecurity: Unknown (3/28/2024)    [facility]     Food Insecurities     Worried about running out of food: Not on file     Food Bought: Not on file   Transportation Needs: Unknown (3/28/2024)    Received from [facility]     Transportation     Worried about transportation: Not on file   Physical Activity: Not on file   Stress: Not on file   Social Connections: Not on file

## 2025-04-22 ENCOUNTER — OFFICE VISIT (OUTPATIENT)
Dept: ORTHOPEDIC SURGERY | Age: 20
End: 2025-04-22
Payer: COMMERCIAL

## 2025-04-22 ENCOUNTER — TELEPHONE (OUTPATIENT)
Dept: ORTHOPEDIC SURGERY | Age: 20
End: 2025-04-22

## 2025-04-22 ENCOUNTER — PREP FOR PROCEDURE (OUTPATIENT)
Dept: ORTHOPEDIC SURGERY | Age: 20
End: 2025-04-22

## 2025-04-22 VITALS — BODY MASS INDEX: 16.07 KG/M2 | WEIGHT: 100 LBS | HEIGHT: 66 IN

## 2025-04-22 DIAGNOSIS — M67.432 GANGLION CYST OF DORSUM OF LEFT WRIST: ICD-10-CM

## 2025-04-22 DIAGNOSIS — S52.572A OTHER CLOSED INTRA-ARTICULAR FRACTURE OF DISTAL END OF LEFT RADIUS, INITIAL ENCOUNTER: Primary | ICD-10-CM

## 2025-04-22 PROCEDURE — G8427 DOCREV CUR MEDS BY ELIG CLIN: HCPCS | Performed by: ORTHOPAEDIC SURGERY

## 2025-04-22 PROCEDURE — 1036F TOBACCO NON-USER: CPT | Performed by: ORTHOPAEDIC SURGERY

## 2025-04-22 PROCEDURE — 99215 OFFICE O/P EST HI 40 MIN: CPT | Performed by: ORTHOPAEDIC SURGERY

## 2025-04-22 PROCEDURE — G8419 CALC BMI OUT NRM PARAM NOF/U: HCPCS | Performed by: ORTHOPAEDIC SURGERY

## 2025-04-22 NOTE — TELEPHONE ENCOUNTER
Attempted to call patient and schedule surgery. Message stated that wireless caller is not available.

## 2025-04-23 ENCOUNTER — PREP FOR PROCEDURE (OUTPATIENT)
Dept: ORTHOPEDIC SURGERY | Age: 20
End: 2025-04-23

## 2025-04-23 ENCOUNTER — TELEPHONE (OUTPATIENT)
Dept: ORTHOPEDIC SURGERY | Age: 20
End: 2025-04-23

## 2025-04-23 DIAGNOSIS — M67.432 GANGLION OF LEFT WRIST: ICD-10-CM

## 2025-04-23 NOTE — PROGRESS NOTES
CHIEF COMPLAINT:   1- Left wrist pain/ dorsal wrist mass(ganglion cyst).  2- Left distal radius 2 parts intra articular displaced fracture, status post ORIF.    DATE OF SURGERY:  11/9/2023    HISTORY:  Ms. Servin is 19 y.o.  female right handed presents today for f/u evaluation of a left wrist dorsal pain with no known injury which started Aug 2024 and went to [facility] on 8/8/2024.  She is complaining of left wrist pain, 7/10. This is better with elevation and worse with ROM. No numbness or tingling sensation.  No other complaint.     No past medical history on file.    Past Surgical History:   Procedure Laterality Date    TONSILLECTOMY AND ADENOIDECTOMY      WRIST FRACTURE SURGERY Left 11/9/2023    LEFT OPEN REDUCTION AND INTERNAL FIXATION WRIST-GALI performed by Gabe Landers MD at [facility] OR       Social History     Socioeconomic History    Marital status: Single     Spouse name: Not on file    Number of children: Not on file    Years of education: Not on file    Highest education level: Not on file   Occupational History    Not on file   Tobacco Use    Smoking status: Never    Smokeless tobacco: Never   Vaping Use    Vaping status: Never Used   Substance and Sexual Activity    Alcohol use: Yes     Comment: Occasionally    Drug use: Yes     Frequency: 7.0 times per week     Types: Marijuana (Weed)    Sexual activity: Not on file   Other Topics Concern    Not on file   Social History Narrative    Not on file     Social Drivers of Health     Financial Resource Strain: Not on file   Food Insecurity: Unknown (3/28/2024)    Received from [employer] [facility]    Food Insecurities     Worried about running out of food: Not on file     Food Bought: Not on file   Transportation Needs: Unknown (3/28/2024)    [facility]    Transportation     Worried about transportation: Not on file   Physical Activity: Not on file   Stress: Not on file

## 2025-04-24 RX ORDER — CALCIUM CARBONATE 500(1250)
500 TABLET ORAL DAILY
COMMUNITY

## 2025-04-24 NOTE — PROGRESS NOTES
[facility] PRE-OPERATIVE INSTRUCTIONS    Day of Procedure:       5/1        Arrival time:      6          Surgery time:730    Take the following medications with a sip of water:  Follow your MD/Surgeons pre-procedure instructions regarding your medications     Do not eat or drink anything after 12:00 midnight prior to your surgery.  This includes water, chewing gum, mints and ice chips.   You may brush your teeth and gargle the morning of your surgery, but do not swallow the water.     Please see your family doctor/pediatrician for a history and physical and/or concerning medications.   Bring any test results/reports from your physicians office.   If you are under the care of a heart doctor or specialist doctor, please be aware that you may be asked to see them for clearance.    You may be asked to stop blood thinners such as Coumadin, Plavix, Fragmin, Lovenox, etc., or any anti-inflammatories such as:  Aspirin, Ibuprofen, Advil, Naproxen prior to your surgery.    We also ask that you stop any over the counter medications such as fish oil, vitamin E, glucosamine, garlic, Multivitamins, COQ 10, etc.    We ask that you do not smoke 24 hours prior to surgery.  We ask that you do not  drink any alcoholic beverages 24 hours prior to surgery     You must make arrangements for a responsible adult to take you home after your surgery.    For your safety, you will not be allowed to leave alone or drive yourself home.  Your surgery will be cancelled if you do not have a ride home.     Also for your safety, you must have someone stay with you the first 24 hours after your surgery.     A parent or legal guardian must accompany a child scheduled for surgery and plan to stay at the hospital until the child is discharged.    Please do not bring other children with you.    For your comfort, please wear simple loose fitting clothing to the hospital.  Please do not bring valuables.    Do not wear any make-up on face or

## 2025-04-29 ENCOUNTER — TELEPHONE (OUTPATIENT)
Dept: ORTHOPEDIC SURGERY | Age: 20
End: 2025-04-29

## 2025-04-30 NOTE — PROGRESS NOTES
You  Jenni Brown now (9:48 AM)       DATE _5/1/25___ PLACE ____  [address]       TIME ____                                    __x__  [address]      ARRIVAL TIME ___                                                                              SURGEONS OFFICE TO GIVE ARRIVAL TIME _x__                                     IF YOU HAVE NOT RECEIVED A TIME CONTACT YOUR SURGEON                                       THE FOLLOWING THINGS NEED TO BE DONE OR YOUR SURGERY WILL BE CANCELLED     NOTHING TO EAT OR DRINK AFTER MIDNIGHT THE NIGHT BEFORE. YOU MAY TAKE ONLY THE FOLLOWING MEDICATIONS WITH A SIP OF WATER ON THE MORNING OF YOUR SURGERY.  ____________________none__________________________________________________________________________YOU MAY BRUSH YOUR TEETH.     2.   A HISTORY/PHYSICAL MUST BE COMPLETED AND DATED WITHIN 30 DAYS OF YOUR SURGERY BY YOUR PCP __                                                                                                                                                                                                 SURGEON _x_                                                                                                                                                                                                 HOSPITALIST __     3.  THE FOLLOWING MUST BE DONE WITHIN 30 DAYS OF SURGERY. LAB __  EKG __ CHEST XRAY __ TO BE DONE BY ____  NOT APPICABLE___x_____     4.   IF YOU TAKE A LONG ACTING INSULIN AT NIGHT, CUT YOUR NORMAL DOSE IN HALF, AND TAKE NO DIABETIC MEDCATION IN THE MORNING.     5.   IF YOU TAKE A GLP-1 RECEPTOR (SUCH AS TRULICITY, MOUNJARO, OZEMPIC-WEEKLY), YOU MUST BE OFF x 7 DAYS. IF YOU TAKE A DAILY DOSE SUCH AS RYBELSUS,      YOU MUST STOP 24 HOURS PRIOR TO SURGERY. LAST DOSE________     6.  IF YOU TAKE A SGLT2 INHIBITOR  (SUCH AS FARXIGA, JARDIANCE, INVOKANA OR SYNJARDY), YOU MUST STOP 3 DAYS PRIOR TO SURGERY. LAST DOSE_______     7.  IF YOU TAKE A

## 2025-05-01 ENCOUNTER — HOSPITAL ENCOUNTER (OUTPATIENT)
Age: 20
Setting detail: OUTPATIENT SURGERY
Discharge: HOME | End: 2025-05-01
Attending: ORTHOPAEDIC SURGERY | Admitting: ORTHOPAEDIC SURGERY
Payer: COMMERCIAL

## 2025-05-01 ENCOUNTER — ANESTHESIA (OUTPATIENT)
Dept: OPERATING ROOM | Age: 20
End: 2025-05-01
Payer: COMMERCIAL

## 2025-05-01 ENCOUNTER — ANESTHESIA EVENT (OUTPATIENT)
Dept: OPERATING ROOM | Age: 20
End: 2025-05-01
Payer: COMMERCIAL

## 2025-05-01 VITALS
HEIGHT: 67 IN | BODY MASS INDEX: 15.54 KG/M2 | RESPIRATION RATE: 15 BRPM | WEIGHT: 99 LBS | TEMPERATURE: 98 F | HEART RATE: 64 BPM | OXYGEN SATURATION: 100 % | SYSTOLIC BLOOD PRESSURE: 111 MMHG | DIASTOLIC BLOOD PRESSURE: 73 MMHG

## 2025-05-01 DIAGNOSIS — M67.432 GANGLION OF LEFT WRIST: Primary | ICD-10-CM

## 2025-05-01 LAB — HCG UR QL: NEGATIVE

## 2025-05-01 PROCEDURE — 7100000001 HC PACU RECOVERY - ADDTL 15 MIN: Performed by: ORTHOPAEDIC SURGERY

## 2025-05-01 PROCEDURE — 3700000000 HC ANESTHESIA ATTENDED CARE: Performed by: ORTHOPAEDIC SURGERY

## 2025-05-01 PROCEDURE — 6360000002 HC RX W HCPCS: Performed by: ORTHOPAEDIC SURGERY

## 2025-05-01 PROCEDURE — 2580000003 HC RX 258: Performed by: ORTHOPAEDIC SURGERY

## 2025-05-01 PROCEDURE — 6360000002 HC RX W HCPCS: Performed by: NURSE ANESTHETIST, CERTIFIED REGISTERED

## 2025-05-01 PROCEDURE — 7100000000 HC PACU RECOVERY - FIRST 15 MIN: Performed by: ORTHOPAEDIC SURGERY

## 2025-05-01 PROCEDURE — 2500000003 HC RX 250 WO HCPCS: Performed by: ORTHOPAEDIC SURGERY

## 2025-05-01 PROCEDURE — 3700000001 HC ADD 15 MINUTES (ANESTHESIA): Performed by: ORTHOPAEDIC SURGERY

## 2025-05-01 PROCEDURE — 88304 TISSUE EXAM BY PATHOLOGIST: CPT

## 2025-05-01 PROCEDURE — 3600000003 HC SURGERY LEVEL 3 BASE: Performed by: ORTHOPAEDIC SURGERY

## 2025-05-01 PROCEDURE — 84703 CHORIONIC GONADOTROPIN ASSAY: CPT

## 2025-05-01 PROCEDURE — 7100000010 HC PHASE II RECOVERY - FIRST 15 MIN: Performed by: ORTHOPAEDIC SURGERY

## 2025-05-01 PROCEDURE — 3600000013 HC SURGERY LEVEL 3 ADDTL 15MIN: Performed by: ORTHOPAEDIC SURGERY

## 2025-05-01 PROCEDURE — 7100000011 HC PHASE II RECOVERY - ADDTL 15 MIN: Performed by: ORTHOPAEDIC SURGERY

## 2025-05-01 PROCEDURE — 2709999900 HC NON-CHARGEABLE SUPPLY: Performed by: ORTHOPAEDIC SURGERY

## 2025-05-01 RX ORDER — SODIUM CHLORIDE 0.9 % (FLUSH) 0.9 %
5-40 SYRINGE (ML) INJECTION EVERY 12 HOURS SCHEDULED
Status: DISCONTINUED | OUTPATIENT
Start: 2025-05-01 | End: 2025-05-01 | Stop reason: HOSPADM

## 2025-05-01 RX ORDER — ONDANSETRON 2 MG/ML
INJECTION INTRAMUSCULAR; INTRAVENOUS
Status: DISCONTINUED | OUTPATIENT
Start: 2025-05-01 | End: 2025-05-01 | Stop reason: SDUPTHER

## 2025-05-01 RX ORDER — PROPOFOL 10 MG/ML
INJECTION, EMULSION INTRAVENOUS
Status: DISCONTINUED | OUTPATIENT
Start: 2025-05-01 | End: 2025-05-01 | Stop reason: SDUPTHER

## 2025-05-01 RX ORDER — MIDAZOLAM HYDROCHLORIDE 1 MG/ML
INJECTION, SOLUTION INTRAMUSCULAR; INTRAVENOUS
Status: DISCONTINUED | OUTPATIENT
Start: 2025-05-01 | End: 2025-05-01 | Stop reason: SDUPTHER

## 2025-05-01 RX ORDER — MAGNESIUM HYDROXIDE 1200 MG/15ML
LIQUID ORAL CONTINUOUS PRN
Status: COMPLETED | OUTPATIENT
Start: 2025-05-01 | End: 2025-05-01

## 2025-05-01 RX ORDER — FENTANYL CITRATE 50 UG/ML
INJECTION, SOLUTION INTRAMUSCULAR; INTRAVENOUS
Status: DISCONTINUED | OUTPATIENT
Start: 2025-05-01 | End: 2025-05-01 | Stop reason: SDUPTHER

## 2025-05-01 RX ORDER — OXYCODONE HYDROCHLORIDE 5 MG/1
5 TABLET ORAL PRN
Status: DISCONTINUED | OUTPATIENT
Start: 2025-05-01 | End: 2025-05-01 | Stop reason: HOSPADM

## 2025-05-01 RX ORDER — TRAMADOL HYDROCHLORIDE 50 MG/1
50 TABLET ORAL EVERY 6 HOURS PRN
Qty: 12 TABLET | Refills: 0 | Status: SHIPPED | OUTPATIENT
Start: 2025-05-01 | End: 2025-05-04

## 2025-05-01 RX ORDER — OXYCODONE HYDROCHLORIDE 5 MG/1
10 TABLET ORAL PRN
Status: DISCONTINUED | OUTPATIENT
Start: 2025-05-01 | End: 2025-05-01 | Stop reason: HOSPADM

## 2025-05-01 RX ORDER — PROCHLORPERAZINE EDISYLATE 5 MG/ML
5 INJECTION INTRAMUSCULAR; INTRAVENOUS
Status: DISCONTINUED | OUTPATIENT
Start: 2025-05-01 | End: 2025-05-01 | Stop reason: HOSPADM

## 2025-05-01 RX ORDER — LABETALOL HYDROCHLORIDE 5 MG/ML
10 INJECTION, SOLUTION INTRAVENOUS
Status: DISCONTINUED | OUTPATIENT
Start: 2025-05-01 | End: 2025-05-01 | Stop reason: HOSPADM

## 2025-05-01 RX ORDER — CEPHALEXIN 500 MG/1
500 CAPSULE ORAL 4 TIMES DAILY
Qty: 12 CAPSULE | Refills: 0 | Status: SHIPPED | OUTPATIENT
Start: 2025-05-01 | End: 2025-05-04

## 2025-05-01 RX ORDER — DEXAMETHASONE SODIUM PHOSPHATE 4 MG/ML
INJECTION, SOLUTION INTRA-ARTICULAR; INTRALESIONAL; INTRAMUSCULAR; INTRAVENOUS; SOFT TISSUE
Status: DISCONTINUED | OUTPATIENT
Start: 2025-05-01 | End: 2025-05-01 | Stop reason: SDUPTHER

## 2025-05-01 RX ORDER — HYDRALAZINE HYDROCHLORIDE 20 MG/ML
10 INJECTION INTRAMUSCULAR; INTRAVENOUS
Status: DISCONTINUED | OUTPATIENT
Start: 2025-05-01 | End: 2025-05-01 | Stop reason: HOSPADM

## 2025-05-01 RX ORDER — SODIUM CHLORIDE 9 MG/ML
INJECTION, SOLUTION INTRAVENOUS PRN
Status: DISCONTINUED | OUTPATIENT
Start: 2025-05-01 | End: 2025-05-01 | Stop reason: HOSPADM

## 2025-05-01 RX ORDER — FENTANYL CITRATE 50 UG/ML
50 INJECTION, SOLUTION INTRAMUSCULAR; INTRAVENOUS EVERY 5 MIN PRN
Status: DISCONTINUED | OUTPATIENT
Start: 2025-05-01 | End: 2025-05-01 | Stop reason: HOSPADM

## 2025-05-01 RX ORDER — SODIUM CHLORIDE 9 MG/ML
INJECTION, SOLUTION INTRAVENOUS CONTINUOUS
Status: DISCONTINUED | OUTPATIENT
Start: 2025-05-01 | End: 2025-05-01 | Stop reason: HOSPADM

## 2025-05-01 RX ORDER — NALOXONE HYDROCHLORIDE 0.4 MG/ML
INJECTION, SOLUTION INTRAMUSCULAR; INTRAVENOUS; SUBCUTANEOUS
Status: DISCONTINUED | OUTPATIENT
Start: 2025-05-01 | End: 2025-05-01 | Stop reason: SDUPTHER

## 2025-05-01 RX ORDER — KETOROLAC TROMETHAMINE 30 MG/ML
INJECTION, SOLUTION INTRAMUSCULAR; INTRAVENOUS
Status: DISCONTINUED | OUTPATIENT
Start: 2025-05-01 | End: 2025-05-01 | Stop reason: SDUPTHER

## 2025-05-01 RX ORDER — LIDOCAINE HYDROCHLORIDE 20 MG/ML
INJECTION, SOLUTION INFILTRATION; PERINEURAL
Status: DISCONTINUED | OUTPATIENT
Start: 2025-05-01 | End: 2025-05-01 | Stop reason: SDUPTHER

## 2025-05-01 RX ORDER — FENTANYL CITRATE 50 UG/ML
25 INJECTION, SOLUTION INTRAMUSCULAR; INTRAVENOUS EVERY 5 MIN PRN
Status: DISCONTINUED | OUTPATIENT
Start: 2025-05-01 | End: 2025-05-01 | Stop reason: HOSPADM

## 2025-05-01 RX ORDER — NALOXONE HYDROCHLORIDE 0.4 MG/ML
INJECTION, SOLUTION INTRAMUSCULAR; INTRAVENOUS; SUBCUTANEOUS PRN
Status: DISCONTINUED | OUTPATIENT
Start: 2025-05-01 | End: 2025-05-01 | Stop reason: HOSPADM

## 2025-05-01 RX ORDER — ONDANSETRON 2 MG/ML
4 INJECTION INTRAMUSCULAR; INTRAVENOUS
Status: DISCONTINUED | OUTPATIENT
Start: 2025-05-01 | End: 2025-05-01 | Stop reason: HOSPADM

## 2025-05-01 RX ORDER — SODIUM CHLORIDE 0.9 % (FLUSH) 0.9 %
5-40 SYRINGE (ML) INJECTION PRN
Status: DISCONTINUED | OUTPATIENT
Start: 2025-05-01 | End: 2025-05-01 | Stop reason: HOSPADM

## 2025-05-01 RX ORDER — BUPIVACAINE HYDROCHLORIDE 5 MG/ML
INJECTION, SOLUTION EPIDURAL; INTRACAUDAL; PERINEURAL
Status: DISCONTINUED | OUTPATIENT
Start: 2025-05-01 | End: 2025-05-01 | Stop reason: ALTCHOICE

## 2025-05-01 RX ADMIN — MIDAZOLAM 2 MG: 1 INJECTION INTRAMUSCULAR; INTRAVENOUS at 06:59

## 2025-05-01 RX ADMIN — LIDOCAINE HYDROCHLORIDE 50 MG: 20 INJECTION, SOLUTION INFILTRATION; PERINEURAL at 07:05

## 2025-05-01 RX ADMIN — SODIUM CHLORIDE: 9 INJECTION, SOLUTION INTRAVENOUS at 07:59

## 2025-05-01 RX ADMIN — DEXAMETHASONE SODIUM PHOSPHATE 8 MG: 4 INJECTION, SOLUTION INTRAMUSCULAR; INTRAVENOUS at 07:09

## 2025-05-01 RX ADMIN — ONDANSETRON 4 MG: 2 INJECTION INTRAMUSCULAR; INTRAVENOUS at 07:09

## 2025-05-01 RX ADMIN — SODIUM CHLORIDE: 9 INJECTION, SOLUTION INTRAVENOUS at 06:44

## 2025-05-01 RX ADMIN — KETOROLAC TROMETHAMINE 30 MG: 30 INJECTION, SOLUTION INTRAMUSCULAR; INTRAVENOUS at 07:49

## 2025-05-01 RX ADMIN — PROPOFOL 200 MG: 10 INJECTION, EMULSION INTRAVENOUS at 07:05

## 2025-05-01 RX ADMIN — FENTANYL CITRATE 50 MCG: 50 INJECTION, SOLUTION INTRAMUSCULAR; INTRAVENOUS at 07:12

## 2025-05-01 RX ADMIN — FENTANYL CITRATE 50 MCG: 50 INJECTION, SOLUTION INTRAMUSCULAR; INTRAVENOUS at 07:05

## 2025-05-01 RX ADMIN — CEFAZOLIN 2000 MG: 2 INJECTION, POWDER, FOR SOLUTION INTRAMUSCULAR; INTRAVENOUS at 07:08

## 2025-05-01 RX ADMIN — NALOXONE HYDROCHLORIDE 0.4 MG: 0.4 INJECTION, SOLUTION INTRAMUSCULAR; INTRAVENOUS; SUBCUTANEOUS at 07:46

## 2025-05-01 ASSESSMENT — ENCOUNTER SYMPTOMS: SHORTNESS OF BREATH: 0

## 2025-05-01 ASSESSMENT — PAIN DESCRIPTION - DESCRIPTORS: DESCRIPTORS: THROBBING;PRESSURE

## 2025-05-01 ASSESSMENT — PAIN - FUNCTIONAL ASSESSMENT
PAIN_FUNCTIONAL_ASSESSMENT: NONE - DENIES PAIN
PAIN_FUNCTIONAL_ASSESSMENT: 0-10
PAIN_FUNCTIONAL_ASSESSMENT: PREVENTS OR INTERFERES SOME ACTIVE ACTIVITIES AND ADLS
PAIN_FUNCTIONAL_ASSESSMENT: NONE - DENIES PAIN

## 2025-05-01 NOTE — ANESTHESIA POSTPROCEDURE EVALUATION
Department of Anesthesiology  Postprocedure Note    Patient: Jenni Servin  MRN: [id number]  YOB: 2005  Date of evaluation: 5/1/2025    Procedure Summary       Date: 05/01/25 Room / Location: [address]    Anesthesia Start: 0659 Anesthesia Stop: 0801    Procedure: LEFT WRIST GANGLION CYSTECTOMY (Left) Diagnosis:       Ganglion of left wrist      (Ganglion of left wrist [M67.432])    Surgeons: Gabe Landers MD Responsible Provider: Ema Phillips DO    Anesthesia Type: general ASA Status: 1            Anesthesia Type: No value filed.    Catracho Phase I: Catracho Score: 10    Catracho Phase II: Catracho Score: 10    Anesthesia Post Evaluation    Patient location during evaluation: PACU  Patient participation: complete - patient participated  Level of consciousness: awake and alert  Airway patency: patent  Nausea & Vomiting: no nausea and no vomiting  Cardiovascular status: blood pressure returned to baseline  Respiratory status: acceptable  Hydration status: euvolemic  Pain management: adequate        No notable events documented.

## 2025-05-01 NOTE — ANESTHESIA PRE PROCEDURE
Department of Anesthesiology  Preprocedure Note       Name:  Jenni Servin   Age:  19 y.o.  :  2005                                          MRN:  [id number]         Date:  2025      Surgeon: Surgeon(s):  Gabe Landers MD    Procedure: Procedure(s):  LEFT WRIST GANGLION CYSTECTOMY    Medications prior to admission:   Prior to Admission medications    Medication Sig Start Date End Date Taking? Authorizing Provider   cephALEXin (KEFLEX) 500 MG capsule Take 1 capsule by mouth 4 times daily for 3 days 25 Yes Gabe Landers MD   traMADol (ULTRAM) 50 MG tablet Take 1 tablet by mouth every 6 hours as needed for Pain for up to 3 days. Intended supply: 3 days. Take lowest dose possible to manage pain Max Daily Amount: 200 mg 25 Yes Gabe Landers MD   calcium carbonate (OSCAL) 500 MG TABS tablet Take 1 tablet by mouth daily   Yes Provider, MD Cameron       Current medications:    Current Facility-Administered Medications   Medication Dose Route Frequency Provider Last Rate Last Admin    0.9 % sodium chloride infusion   IntraVENous Continuous Gabe Landers  mL/hr at 25 0644 New Bag at 25 0644    ceFAZolin (ANCEF) 2,000 mg in sterile water 20 mL IV syringe  2,000 mg IntraVENous Once Gabe Landers MD           Allergies:    Allergies   Allergen Reactions    Latex Rash    Adhesive Tape Dermatitis       Problem List:    Patient Active Problem List   Diagnosis Code    Closed fracture of left distal radius S52.502A    Current smoker F17.200    Ganglion of left wrist M67.432       Past Medical History:        Diagnosis Date    Piercing of nipple     bilateral- pt told to remove       Past Surgical History:        Procedure Laterality Date    TONSILLECTOMY AND ADENOIDECTOMY      WRIST FRACTURE SURGERY Left 2023    LEFT OPEN REDUCTION AND INTERNAL FIXATION WRIST-GALI performed by Gabe Landers MD at [facility] ASC OR       Social History:    Social History

## 2025-05-01 NOTE — OP NOTE
[place] [id number]                            OPERATIVE REPORT      PATIENT NAME: KARRIE SANDOVAL             : 2005  MED REC NO: [id number]                      ROOM: ASC OR  ACCOUNT NO: [id number]                       ADMIT DATE: 2025  PROVIDER: Gabe Landers MD      DATE OF PROCEDURE:  2025    SURGEON:  Gabe Landers MD    ASSISTANT:  Faina Malagon CNP    PREOPERATIVE DIAGNOSIS:  Left dorsal wrist ganglion cyst.    POSTOPERATIVE DIAGNOSIS:  Left dorsal wrist ganglion cyst.    PROCEDURE DONE:  Excision left dorsal wrist ganglion cyst.    ANESTHESIA:  General anesthesia.    ESTIMATED BLOOD LOSS:  Minimal.    COMPLICATIONS:  None.    TOURNIQUET:  Left upper arm, 250 mmHg.    SPECIMEN:  Ganglion cyst left dorsal wrist for histopathology.    INDICATIONS:  This is a 19-year-old  female, right-hand dominant, who presented to our office with localized painful swelling in the dorsal aspect of the left wrist.  She had a history of distal radius open reduction and internal fixation in 2023.  She was diagnosed with a ganglion cyst.  All risks, benefits, and alternatives discussed with the patient and she elected to proceed with surgical excision.    DESCRIPTION OF PROCEDURE:  The patient's left wrist was marked.  She received 2 g Ancef IV preoperatively.  The patient was then brought to the operating room and underwent general anesthesia.  A well-padded tourniquet was placed in left upper arm.  The left upper extremity was then prepped and draped in regular sterile routine fashion.  A time-out was called confirming the patient's name and site of procedure.    Esmarch was used for exsanguination and tourniquet was inflated to 250 mmHg.  A dorsal incision was made transversely over the ganglion cyst at the dorsal left wrist.  Careful dissection was performed.  We identified the ganglion cyst,  Spoke with patient she states she feels like her heart is racing and she feels as if she wants to pass out.  Patient states she was driving in her car earlier and she felt like she wanted to pass out.  States symptoms came on about an hour ago. Patient states when she made it home she drank fluids hoping that would help.  Reports that she has mitral valve prolapse.  Advised patient to hang up and call [id number] to seek immediate medical.  Patient verbalized understanding.     Reason for Disposition   Passed out (i.e., fainted, collapsed and was not responding)    Protocols used: HEART RATE AND HEARTBEAT [id number]

## 2025-05-01 NOTE — H&P
Preoperative H&P Update    The patient's History and Physical in the medical record from 4/22/2025 was reviewed by me today.    Past Medical History:   Diagnosis Date    Piercing of nipple     bilateral- pt told to remove     Past Surgical History:   Procedure Laterality Date    TONSILLECTOMY AND ADENOIDECTOMY      WRIST FRACTURE SURGERY Left 11/9/2023    LEFT OPEN REDUCTION AND INTERNAL FIXATION WRIST-GALI performed by Gabe Landers MD at [facility] ASC OR     No current facility-administered medications on file prior to encounter.     Current Outpatient Medications on File Prior to Encounter   Medication Sig Dispense Refill    calcium carbonate (OSCAL) 500 MG TABS tablet Take 1 tablet by mouth daily         Allergies   Allergen Reactions    Latex Rash    Adhesive Tape Dermatitis      I reviewed the HPI, medications, allergies, reason for surgery, diagnosis and treatment plan and there has been no change.    The patient was evaluated by me today. Physical exam findings for this update include:    Vitals:    05/01/25 0620   BP: 120/84   Pulse: 60   Resp: 16   Temp: 98.2 °F (36.8 °C)   SpO2: 100%     Airway is intact  Chest: chest clear, no wheezing, rales, normal symmetric air entry, no tachypnea, retractions or cyanosis  Heart: regular rate and rhythm ; heart sounds normal  Findings on exam of the body region where surgery is to be performed include:  Left wrist pain/ dorsal wrist mass(ganglion cyst).     Electronically signed by Gabe Landers MD on 5/1/2025 at 6:37 AM

## 2025-05-01 NOTE — DISCHARGE INSTRUCTIONS
Post op instruction:  1- D/C home  2- Dx Left wrist pain/ dorsal wrist mass(ganglion cyst).   3- NWB left wrist  4- Elevation surgical site, with ice  5- Keep Drsg dry and clean, 3 days, then BandAid.  6- F/U in 2 weeks.       Gabe Landers MD, 5/1/2025        GENERAL SURGERY DISCHARGE INSTRUCTIONS    Follow your surgeons instructions.  Follow up with your surgeon as directed.  Observe the operative area for signs of excessive bleeding.If needed apply pressure,elevate if able and contact your surgeon.  Observe the operative site for any signs of infection- such as increased pain,redness,fever greater than 101 degrees,swelling, foul odor or drainage.Contact your surgeon if any of these symptoms are present.  Keep operative site clean and dry.  Do not remove dressing unless instructed to by surgeon.  Apply ice as directed.  If unable to urinate once you are at home,  notify your surgeon or go to the Emergency Room.  Avoid pulling,pushing or tugging to suture line.  If you become short of breath call your doctor or go to the ER.  Take medications as directed.  Pain medication should be taken with food.  Do not drive or operate machinery while taking narcotics.  For any problems or question call your surgeon.       ANESTHESIA DISCHARGE INSTRUCTIONS    Wear your seatbelt home.  You are under the influence of drugs-do not drink alcohol,drive,operate machinery,or make any important decisions or sign any legal documentsfor 24 hours  A responsible adult needs to be with you for 24 hours.  You may experience lightheadedness,dizziness,or sleepiness following surgery.  Rest at home today- increase activity as tolerated.  Progress slowly to a regular diet unless your physician has instructed you otherwise.Drink plenty of water.  If nausea becomes a problem call your physician.  Coughing,sore throat,and muscle aches are other side effects of anesthesia,and should improve with time.  Do not drive,operate machinery while taking

## 2025-05-07 ENCOUNTER — OFFICE VISIT (OUTPATIENT)
Age: 20
End: 2025-05-07

## 2025-05-07 VITALS
OXYGEN SATURATION: 98 % | HEART RATE: 67 BPM | SYSTOLIC BLOOD PRESSURE: 119 MMHG | HEIGHT: 67 IN | WEIGHT: 103.4 LBS | DIASTOLIC BLOOD PRESSURE: 74 MMHG | TEMPERATURE: 97.8 F | BODY MASS INDEX: 16.23 KG/M2

## 2025-05-07 DIAGNOSIS — R21 RASH: Primary | ICD-10-CM

## 2025-05-07 RX ORDER — TRIAMCINOLONE ACETONIDE 1 MG/G
OINTMENT TOPICAL
Qty: 30 G | Refills: 0 | Status: SHIPPED | OUTPATIENT
Start: 2025-05-07 | End: 2025-05-14

## 2025-05-07 RX ORDER — TRIAMCINOLONE ACETONIDE 1 MG/G
OINTMENT TOPICAL
Qty: 30 G | Refills: 0 | Status: SHIPPED | OUTPATIENT
Start: 2025-05-07 | End: 2025-05-07

## 2025-05-07 ASSESSMENT — ENCOUNTER SYMPTOMS
RHINORRHEA: 0
COUGH: 0
ROS SKIN COMMENTS: POSITIVE FOR ITCHING

## 2025-05-07 NOTE — PATIENT INSTRUCTIONS
New Prescriptions    TRIAMCINOLONE (KENALOG) 0.1 % OINTMENT    apply topically 2 times daily for 1 week.  Do not use longer than 1 week     -Use the steroid ointment.  Also apply a thick, unscented lotion daily  -Follow up with Dr. Landers as scheduled  -Return as needed

## 2025-05-07 NOTE — PROGRESS NOTES
Jenni Servin (:  2005) is a 19 y.o. female, New patient, here for evaluation of the following chief complaint(s):  Wrist Pain (Left skin irritation p states she has been taking allergy mediation for 3 days )      ASSESSMENT/PLAN:    ICD-10-CM    1. Rash  R21           Ddx -contact dermatitis, tinea, anaphylaxis, urticaria, Miller-Issac syndrome, scabies, other  Suspect this is a contact dermatitis from the Ace wrap she has been wearing on the area related to her ganglion cyst removal from her wrist few days ago.  Management Given: Triamcinolone ointment.  Recommended a thick unscented lotion daily.  Follow-up with her orthopedic surgeon as scheduled in a few days.  Return as needed.    SUBJECTIVE/OBJECTIVE:  Patient presents for itching rash on her forearm that started a few days ago.  Of note she did have a left wrist ganglion cystectomy 6 days ago by Dr. Landers.  She has had an Ace wrap on the arm.  She has removed it a few times.  She denies any known new soaps, products, detergents, medications.  No one else in the household has a similar rash.  Denies recent travel.  Has not been in the woods recently.  Denies fever cough congestion rhinorrhea          Vitals:    25 1400   BP: 119/74   Pulse: 67   Temp: 97.8 °F (36.6 °C)   TempSrc: Oral   SpO2: 98%   Weight: 46.9 kg (103 lb 6.4 oz)   Height: 1.702 m (5' 7\")     Review of Systems   Constitutional:  Negative for fever.   HENT:  Negative for congestion and rhinorrhea.    Respiratory:  Negative for cough.    Skin:  Positive for rash.        Positive for itching     Physical Exam  Constitutional:       General: She is not in acute distress.     Appearance: Normal appearance. She is well-developed. She is not ill-appearing, toxic-appearing or diaphoretic.   HENT:      Head: Normocephalic and atraumatic.   Pulmonary:      Effort: Pulmonary effort is normal. No respiratory distress.   Musculoskeletal:         General: Normal range of motion.

## 2025-05-14 ENCOUNTER — OFFICE VISIT (OUTPATIENT)
Dept: ORTHOPEDIC SURGERY | Age: 20
End: 2025-05-14

## 2025-05-14 VITALS — HEIGHT: 67 IN | WEIGHT: 103 LBS | BODY MASS INDEX: 16.17 KG/M2

## 2025-05-14 DIAGNOSIS — T78.40XA ALLERGY, INITIAL ENCOUNTER: ICD-10-CM

## 2025-05-14 DIAGNOSIS — M67.432 GANGLION OF LEFT WRIST: Primary | ICD-10-CM

## 2025-05-14 PROCEDURE — 99024 POSTOP FOLLOW-UP VISIT: CPT | Performed by: NURSE PRACTITIONER

## 2025-05-14 NOTE — PROGRESS NOTES
DIAGNOSIS:  Left dorsal wrist ganglion removal.    DATE OF SURGERY:  5/1/2025.    HISTORY OF PRESENT ILLNESS:  Ms. Servin 19 y.o.  female who came in today for 2 weeks postoperative visit.  The patient denies any significant pain in the left hand.Rates pain a 3/10 VAS mild, aching, intermittent and are improving. Aggravating factors movement. Alleviating factors rest. She has been WBAT.  No numbness or tingling sensation. No fever or Chills.     PHYSICAL EXAMINATION:  The incision healing well .  No signs of any erythema or drainage, minimal swelling. She has no pain with the active or passive range of motion of the left wrist, good ROM.  She has intact sensation distally, and she is neurovascularly intact.    Surgical pathology consistent with ganglion cyst.     IMPRESSION:  2 weeks out from left dorsal wrist ganglion removal, and doing very well.    PLAN: She can go back to normal activity with no heavy impact activities for 6 weeks.  The patient will come back for a follow up in 3 months or as needed.      The patient understands that there is a chance of ganglion recurrence even after surgical excision.      Faina Malagon, APRN - CNP

## (undated) DEVICE — STERILE VELCLOSE ELASTIC BANDAGE, 4IN: Brand: VELCLOSE

## (undated) DEVICE — MASTISOL ADHESIVE LIQ 2/3ML

## (undated) DEVICE — APPLICATOR MEDICATED 26 CC SOLUTION HI LT ORNG CHLORAPREP

## (undated) DEVICE — GLOVE ORTHO 8   MSG9480

## (undated) DEVICE — ELECTRODE PT RET AD L9FT HI MOIST COND ADH HYDRGEL CORDED

## (undated) DEVICE — DRILL, AO, SCALED: Brand: VARIAX

## (undated) DEVICE — PADDING CAST W4INXL4YD SPUN DACRON POLY POR NON STERILE

## (undated) DEVICE — BNDG,ELSTC,MATRIX,STRL,4"X5YD,LF,HOOK&LP: Brand: MEDLINE

## (undated) DEVICE — SUTURE VICRYL + SZ 3-0 L18IN ABSRB UD SH 1/2 CIR TAPERCUT NDL VCP864D

## (undated) DEVICE — PADDING CAST W4INXL4YD ST COT RAYON MICROPLEATED HIGHLY

## (undated) DEVICE — DRAPE HND W114XL142IN BLU POLYPR W O PCH FEN CRD AND TB HLDR

## (undated) DEVICE — SYRINGE IRRIG 60ML SFT PLIABLE BLB EZ TO GRP 1 HND USE W/

## (undated) DEVICE — HOLDING PIN: Brand: ANCHORAGE

## (undated) DEVICE — HYPODERMIC SAFETY NEEDLE: Brand: MAGELLAN

## (undated) DEVICE — C-ARM: Brand: UNBRANDED

## (undated) DEVICE — GLOVE SURG SZ 65 L12IN THK75MIL DK GRN LTX FREE

## (undated) DEVICE — DRESSING,GAUZE,XEROFORM,CURAD,1"X8",ST: Brand: CURAD

## (undated) DEVICE — BANDAGE COMPR W4INXL12FT E DISP ESMARCH EVEN

## (undated) DEVICE — UPPER EXTREMTY: Brand: MEDLINE INDUSTRIES, INC.

## (undated) DEVICE — ZIMMER® STERILE DISPOSABLE TOURNIQUET CUFF WITH PLC, DUAL PORT, SINGLE BLADDER, 18 IN. (46 CM)

## (undated) DEVICE — GLOVE ORANGE PI 8   MSG9080

## (undated) DEVICE — MEDICINE CUP, GRADUATED, STER: Brand: MEDLINE

## (undated) DEVICE — SYRINGE 60ML BULB IRRIG ST LF

## (undated) DEVICE — GLOVE SURG SZ 6 THK91MIL LTX FREE SYN POLYISOPRENE ANTI

## (undated) DEVICE — SUTURE MONOCRYL + SZ 4-0 L27IN ABSRB UD L19MM PS-2 3/8 CIR MCP426H

## (undated) DEVICE — INTENDED FOR TISSUE SEPARATION, AND OTHER PROCEDURES THAT REQUIRE A SHARP SURGICAL BLADE TO PUNCTURE OR CUT.: Brand: BARD-PARKER ® STAINLESS STEEL BLADES

## (undated) DEVICE — PACK PROCEDURE SURG EXTREMITY MFFOP CUST

## (undated) DEVICE — SUTURE MCRYL + SZ 4-0 L27IN ABSRB UD L19MM PS-2 3/8 CIR MCP426H

## (undated) DEVICE — STRIP,CLOSURE,WOUND,MEDI-STRIP,1/2X4: Brand: MEDLINE

## (undated) DEVICE — SUTURE VCRL + SZ 3-0 L18IN ABSRB UD SH 1/2 CIR TAPERCUT NDL VCP864D

## (undated) DEVICE — GLOVE SURG SZ 6 L12IN THK75MIL DK GRN LTX FREE

## (undated) DEVICE — TOWEL,OR,DSP,ST,BLUE,STD,4/PK,20PK/CS: Brand: MEDLINE

## (undated) DEVICE — GLOVE SURG SZ 65 THK91MIL LTX FREE SYN POLYISOPRENE

## (undated) DEVICE — CORD,CAUTERY,BIPOLAR,STERILE: Brand: MEDLINE

## (undated) DEVICE — GAUZE,SPONGE,4"X4",8PLY,STRL,LF,10/TRAY: Brand: MEDLINE

## (undated) DEVICE — SOLUTION IRRIG 1000ML 0.9% SOD CHL USP POUR PLAS BTL